# Patient Record
Sex: FEMALE | Race: WHITE | URBAN - METROPOLITAN AREA
[De-identification: names, ages, dates, MRNs, and addresses within clinical notes are randomized per-mention and may not be internally consistent; named-entity substitution may affect disease eponyms.]

---

## 2017-03-02 ENCOUNTER — OFFICE VISIT (OUTPATIENT)
Dept: DERMATOLOGY | Facility: AMBULATORY SURGERY CENTER | Age: 47
End: 2017-03-02

## 2017-03-02 VITALS
WEIGHT: 225 LBS | OXYGEN SATURATION: 98 % | TEMPERATURE: 98.6 F | BODY MASS INDEX: 42.48 KG/M2 | SYSTOLIC BLOOD PRESSURE: 126 MMHG | HEIGHT: 61 IN | RESPIRATION RATE: 18 BRPM | HEART RATE: 62 BPM | DIASTOLIC BLOOD PRESSURE: 78 MMHG

## 2017-03-02 DIAGNOSIS — Z85.828 HISTORY OF BASAL CELL CANCER: ICD-10-CM

## 2017-03-02 DIAGNOSIS — D69.2 PIGMENTED PURPURA (HCC): ICD-10-CM

## 2017-03-02 DIAGNOSIS — D18.01 CHERRY ANGIOMA: ICD-10-CM

## 2017-03-02 DIAGNOSIS — L57.0 LICHENOID KERATOSIS: ICD-10-CM

## 2017-03-02 DIAGNOSIS — L91.8 SKIN TAG: ICD-10-CM

## 2017-03-02 DIAGNOSIS — Z87.898 HISTORY OF ATYPICAL NEVUS: ICD-10-CM

## 2017-03-02 DIAGNOSIS — Z85.820 PERSONAL HISTORY OF MALIGNANT MELANOMA OF SKIN: Primary | ICD-10-CM

## 2017-03-02 DIAGNOSIS — L90.5 SCAR CONDITION AND FIBROSIS OF SKIN: ICD-10-CM

## 2017-03-02 DIAGNOSIS — Z87.2 HISTORY OF ACTINIC KERATOSES: ICD-10-CM

## 2017-03-02 DIAGNOSIS — L82.1 OTHER SEBORRHEIC KERATOSIS: ICD-10-CM

## 2017-03-02 DIAGNOSIS — L73.8 SEBACEOUS HYPERPLASIA: ICD-10-CM

## 2017-03-02 NOTE — PATIENT INSTRUCTIONS
Common Skin Findings    Below are some common benign skin conditions that may have been discussed during your exam:    Dermatofibroma - a benign scar like bump that often looks like a mole but dimples in with lateral pressure. No treatment is required unless it is symptomatic or enlarging in which case a biopsy may become necessary. Amin Angiomas - these are the bright red bumps usually found on the abdomen or trunk. They are benign. Sometimes they will become irritated if traumatized, bleed and need to be removed. They often appear in middle-aged to older adults. Skin Tags - these are benign flesh or brown polyp-like growths common around areas of friction such as the neck, body creases, or around eyes. Nevus - this is the technical term for a benign mole. Changing nevi need to be evaluated and a biopsy may become necessary. Lentigo - this is a benign brown spot usually due to sun exposure. Seborrheic Keratosis - is a common skin growth that is benign and most often appearing in middle-aged to older adults. Most are tan or brown but may vary from flesh colored to black. These start as small bumps that slowly thicken and get a warty surface. A very useful website that you can visit for more information on common skin conditions:  yaltyReview.it  This website was created and is maintained by the [address] [place] [address] [employer]at[employer]. It is the largest, most influential and most representative dermatology group in the [place].

## 2017-03-02 NOTE — MR AVS SNAPSHOT
Visit Information Date & Time Provider Department Dept. Phone Encounter #  
 3/2/2017  3:00 PM MD Teofilo Moe [id number] [phone] [id number] Your Appointments 3/2/2017  3:00 PM  
SKIN EXAM with MD Teofilo Moe 8057 [facility]) Appt Note: est pt; 6 mo skin exam w/DrMoshe [address] [facility] [address] [facility] [address] [facility] [address]  
  
    
 9/7/2017  3:00 PM  
SKIN EXAM with MD Teofilo Moe [facility] Appt Note: est pt: 3 mo skin exam  
 [facility] [address] [facility]  
[phone] Upcoming Health Maintenance Date Due DTaP/Tdap/Td series (1 - Tdap) 3/12/1989 PAP AKA CERVICAL CYTOLOGY 3/12/1989 INFLUENZA AGE 9 TO ADULT 8/1/2016 Allergies as of 3/2/2017  Review Complete On: 3/2/2017 By: Stefany West LPN Severity Noted Reaction Type Reactions Adhesive  06/12/2012    Unable to Obtain Ciprofloxacin  06/12/2012    Unknown (comments) Abstraction- unable to ask patient. Clindamycin  06/12/2012    Unable to Obtain Keflex [Cephalexin]  06/12/2012    Unable to Obtain Abstraction- unable to ask patient. Current Immunizations  Never Reviewed No immunizations on file. Not reviewed this visit Vitals BP  
  
  
  
  
  
 126/78 Vitals History BMI and BSA Data Body Mass Index Body Surface Area 42.51 kg/m 2 2.1 m 2 Preferred Pharmacy Pharmacy Name Phone [address], [address] [place] [address] [phone] Your Updated Medication List  
  
   
This list is accurate as of: 3/2/17  2:57 PM.  Always use your most recent med list.  
  
  
  
  
 allopurinol 100 mg tablet Commonly known as:  Jaunita Phenes Take  by mouth daily. * AUGMENTIN 875-125 mg per tablet Generic drug:  amoxicillin-clavulanate Take  by mouth every twelve (12) hours. * amoxicillin-clavulanate 500-125 mg per tablet Commonly known as:  AUGMENTIN  
take 1 tablet by mouth three times a day FUROSEMIDE PO Take  by mouth. ibuprofen 200 mg Cap Take  by mouth.  
  
 lisinopril 10 mg tablet Commonly known as:  Luiza Alecia Take  by mouth daily. magnesium oxide 500 mg Tab Take  by mouth. METOPROLOL SUCCINATE PO Take  by mouth.  
  
 multivitamin tablet Commonly known as:  ONE A DAY Take 1 Tab by mouth daily. NORVASC PO Take  by mouth. PriLOSEC 20 mg capsule Generic drug:  omeprazole Take 40 mg by mouth daily. sodium bicarbonate 650 mg tablet Take  by mouth four (4) times daily. triamcinolone acetonide 0.1 % ointment Commonly known as:  KENALOG Apply 1 g to affected area two (2) times a day. use thin layer  Indications: SKIN RASH  
  
 VITAMIN D3 1,000 unit Cap Generic drug:  cholecalciferol Take  by mouth. * Notice: This list has 2 medication(s) that are the same as other medications prescribed for you. Read the directions carefully, and ask your doctor or other care provider to review them with you. Patient Instructions Common Skin Findings Below are some common benign skin conditions that may have been discussed during your exam: 
 
Dermatofibroma  a benign scar like bump that often looks like a mole but dimples in with lateral pressure. No treatment is required unless it is symptomatic or enlarging in which case a biopsy may become necessary. Amin Angiomas  these are the bright red bumps usually found on the abdomen or trunk. They are benign. Sometimes they will become irritated if traumatized, bleed and need to be removed. They often appear in middle-aged to older adults. Skin Tags  these are benign flesh or brown polyp-like growths common around areas of friction such as the neck, body creases, or around eyes. Nevus  this is the technical term for a benign mole. Changing nevi need to be evaluated and a biopsy may become necessary. Lentigo  this is a benign brown spot usually due to sun exposure. Seborrheic Keratosis  is a common skin growth that is benign and most often appearing in middle-aged to older adults. Most are tan or brown but may vary from flesh colored to black. These start as small bumps that slowly thicken and get a warty surface. A very useful website that you can visit for more information on common skin conditions: 
LoyaltyReview.it This website was created and is maintained by the [facility]. It is the largest, most influential and most representative dermatology group in the [place]. Introducing [facility] [place] [facility]! [employer] [place] [employer] introduces Watch-Sites patient portal. Now you can access parts of your medical record, email your doctor's office, and request medication refills online. 1. In your internet browser, go to [email or web address] 2. Click on the First Time User? Click Here link in the Sign In box. You will see the New Member Sign Up page. 3. Enter your Watch-Sites Access Code exactly as it appears below. You will not need to use this code after youve completed the sign-up process. If you do not sign up before the expiration date, you must request a new code. · Watch-Sites Access Code: [email or web address] Expires: 5/31/2017  2:57 PM 
 
4. Enter the last four digits of your Social Security Number (xxxx) and Date of Birth (mm/dd/yyyy) as indicated and click Submit. You will be taken to the next sign-up page. 5. Create a Watch-Sites ID.  This will be your Watch-Sites login ID and cannot be changed, so think of one that is secure and easy to remember. 6. Create a [employer] password. You can change your password at any time. 7. Enter your Password Reset Question and Answer. This can be used at a later time if you forget your password. 8. Enter your e-mail address. You will receive e-mail notification when new information is available in [address] 9. Click Sign Up. You can now view and download portions of your medical record. 10. Click the Download Summary menu link to download a portable copy of your medical information. If you have questions, please visit the Frequently Asked Questions section of the [employer] website. Remember, [employer] is NOT to be used for urgent needs. For medical emergencies, dial 911. Now available from your iPhone and Android! Please provide this summary of care documentation to your next provider. If you have any questions after today's visit, please call [phone].

## 2017-03-02 NOTE — PROGRESS NOTES
Chief complaint: Albinism, history of multiple malignant melanomas, basal cell carcinomas, and actinic keratoses for routine exam, history of lung cancer and renal disease       History of present illness: Ms. Amadou Dubose is a 64-year-old lady here for followup. Her last exam was in November 2016. 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 Her last melanoma was diagnosed in approximately 2001. She has also had several basal cell carcinomas managed surgically (last in 2005 left arm), and actinic keratoses managed with cryotherapy (not her preferred method) and topical Aldara. She had surgery in June 2013 followed by chemotherapy for non-small cell lung cancer incidentally found, and she subsequently developed significant renal insufficiency from the chemotherapy. Her renal function is monitored routinely and her creatine had increased to 3.4 in June but has now decreased to 2.9. Surgery for access to dialysis was discussed but is now on hold based on her improving creatinine. She feels in her usual state of health with occasional low energy and stable weight. In December 2015 she had an emergency appendectomy and has recovered well. She reports that despite multiple medical issues she is upbeat and in good spirits, she is now self-employed with [employer]. I reviewed her allergies medications medical and social history. For her skin exam today she notes 2 new spots on her left shoulder, but has no other skin complaints. During her exam in February 2014 a nevus with moderate atypia was narrowly excised from her left posterior thigh. The site is well healed without symptoms. Exam: She is awake alert oriented 64-year-old lady who appears well and in no distress. There is no preauricular, submandibular, or cervical or axillary or inguinal lymphadenopathy.  She has fair skin with white hair and horizontal nystagmus, consistent with her albinism. She has multiple healed surgical scars on her face neck trunk and extremities where skin cancers have been treated. All scars are soft, there is no evidence of recurrence on visual inspection or palpation. She has multiple cherry angiomas, small in size, on her trunk scalp face and arms. She has a dilated vein similar to venous lake on her right upper inner arm, unchanged from previous visit. She has small skin tags on her neck and back. She has several tan seborrheic keratoses on her trunk. She has multiple areas of sebaceous hyperplasia on her forehead and a small yellow papule on her left lower lip at the vermilion most consistent with another sebaceous hyperplasia and several milia on her cheeks and nose. Her left posterior thigh shows a pink subtle scar were nevus with moderate atypia was excised. There is no clinical evidence of persistence or recurrence of this nevus. She has symmetric tan macular lesions on her left shoulder and her left mid back, no atypical features, unchanged from prior examinations. The 2 lesion she noted on her left shoulder are 1 of the existing tan macules without atypical features and a small ecchymosis. Her right dorsal hand has lightly inflamed lichenoid keratosis , less inflamed than on last exam. She has pigmented purpura on her legs, fading. Assessment/plan:    1. History of multiple skin cancers, including multiple melanomas and basal cell carcinomas, and actinic keratoses. Healed surgical scars without evidence of recurrence and no regional adenopathy. Cherry angiomas. Small skin tags. Seborrheic keratoses. Sebaceous hyperplasia, one on the lower lip. Pigmented purpura on the bilateral lower legs, predominantly right. Milia. Venous lake on the arm. Small nevi. Each diagnosis was discussed.  Previously treated sites are well-healed without evidence of recurrence, there is no regional adenopathy. She remains very well educated about her skin, her albinism, and her risk for new skin cancer development and recurrence of previously treated skin cancers. I discussed sun protection, sunscreen use, the warning signs of skin cancer, the need for self-skin examinations, and the need for regular dermatology exams every 6 months. She should follow up sooner as needed if new, changing, or symptomatic skin lesions arise. She understands well. 2. Recently diagnosed atypical nevus Feb 2014, left posterior thigh. The site is healed with a scar, no evidence of residual lesion. Observation with her routine skin exams. 3. She will follow-up with her oncologist, renal MD, and OBMAURICIO and Dr. Gio Guerra at [facility].

## 2017-09-07 ENCOUNTER — OFFICE VISIT (OUTPATIENT)
Dept: DERMATOLOGY | Facility: AMBULATORY SURGERY CENTER | Age: 47
End: 2017-09-07

## 2017-09-07 VITALS
RESPIRATION RATE: 18 BRPM | HEART RATE: 64 BPM | TEMPERATURE: 98.3 F | SYSTOLIC BLOOD PRESSURE: 140 MMHG | DIASTOLIC BLOOD PRESSURE: 72 MMHG | OXYGEN SATURATION: 97 %

## 2017-09-07 DIAGNOSIS — R23.8 VENOUS LAKE: ICD-10-CM

## 2017-09-07 DIAGNOSIS — L57.0 ACTINIC KERATOSIS: ICD-10-CM

## 2017-09-07 DIAGNOSIS — Z87.898 HISTORY OF ATYPICAL NEVUS: ICD-10-CM

## 2017-09-07 DIAGNOSIS — D18.01 CHERRY ANGIOMA: ICD-10-CM

## 2017-09-07 DIAGNOSIS — L91.8 SKIN TAG: ICD-10-CM

## 2017-09-07 DIAGNOSIS — Z85.828 HISTORY OF BASAL CELL CANCER: ICD-10-CM

## 2017-09-07 DIAGNOSIS — L82.1 OTHER SEBORRHEIC KERATOSIS: ICD-10-CM

## 2017-09-07 DIAGNOSIS — L90.5 SCAR CONDITION AND FIBROSIS OF SKIN: ICD-10-CM

## 2017-09-07 DIAGNOSIS — Z85.820 PERSONAL HISTORY OF MALIGNANT MELANOMA OF SKIN: ICD-10-CM

## 2017-09-07 DIAGNOSIS — D22.9 MULTIPLE BENIGN NEVI WITHOUT ATYPIA: ICD-10-CM

## 2017-09-07 DIAGNOSIS — E70.30 ALBINISM (HCC): Primary | ICD-10-CM

## 2017-09-07 RX ORDER — CALCITRIOL 0.25 UG/1
0.25 CAPSULE ORAL DAILY
COMMUNITY

## 2017-09-07 NOTE — PROGRESS NOTES
Chief complaint: Albinism, history of multiple malignant melanomas, basal cell carcinomas, and actinic keratoses for routine exam, history of lung cancer and renal disease      History of present illness: Ms. Westley Rosado is a 53-year-old lady here for followup. Her last exam was in March 2016. 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 Her last melanoma was diagnosed in approximately 2001. She has also had several basal cell carcinomas managed surgically (last in 2005 left arm), and actinic keratoses managed with cryotherapy (not her preferred method) and topical Aldara. She had surgery in June 2013 followed by chemotherapy for non-small cell lung cancer incidentally found, and she subsequently developed significant renal insufficiency from the chemotherapy. Her renal function is monitored routinely and her creatine has been relatively stable ranging around 3. She had CT scan performed in June, which did show interval increase in size of a para-aortic lymph node and an airspace process, and follow-up CT scan more recently identified increase in the size of these nodules, with suggestion this could be an inflammatory or infectious process, malignancy could not be excluded. She is currently undergoing evaluation for approach to diagnosis. She has also had some headaches recently, she has had a head CT as well. Results are pending. She has occasional low energy and stable weight. In December 2015 she had an emergency appendectomy and has recovered well. She reports that despite multiple medical issues she is upbeat and in good spirits, she is now self-employed with [employer]. I reviewed her allergies medications medical and social history. For her skin exam today she notes a persistent scaly area on her right hand and on her upper lip, not significantly changing.  During her exam in February 2014 a nevus with moderate atypia was narrowly excised from her left posterior thigh. The site is well healed without symptoms. Exam: She is awake alert oriented 39-year-old lady who appears well and in no distress. There is no preauricular, submandibular, or cervical or axillary or inguinal lymphadenopathy. She has fair skin with white hair and horizontal nystagmus, consistent with her albinism. She has multiple healed surgical scars on her face neck trunk and extremities where skin cancers have been treated. All scars are soft, there is no evidence of recurrence on visual inspection or palpation. She has multiple cherry angiomas, small in size, on her trunk scalp face and arms, most numerous on her back. She has a dilated vein similar to venous lake on her right upper inner arm, unchanged from previous visit. She has small skin tags on her neck and back. She has several tan seborrheic keratoses on her trunk. She has multiple areas of sebaceous hyperplasia on her forehead and a small yellow papule on her left lower lip at the vermilion most consistent with another sebaceous hyperplasia as well as several milia on her cheeks and nose. Her left posterior thigh shows a pink subtle scar were nevus with moderate atypia was excised. There is no clinical evidence of persistence or recurrence of this nevus. She has symmetric tan macular lesions on her left shoulder and her left mid back, no atypical features, unchanged from prior examinations. She has a scaly patch on her right dorsal hand, thin patch on her upper lip, both consistent with thin actinic keratoses. Assessment/plan:    1. History of multiple skin cancers, including multiple melanomas and basal cell carcinomas, and actinic keratoses. Healed surgical scars without evidence of recurrence and no regional adenopathy. Cherry angiomas. Small skin tags. Seborrheic keratoses. Sebaceous hyperplasia, one on the lower lip. Milia.  Venous sinha on the arm. Small nevi. Each diagnosis was discussed. Previously treated sites are well-healed without evidence of recurrence, there is no regional adenopathy. She remains very well educated about her skin, her albinism, and her risk for new skin cancer development and recurrence of previously treated skin cancers. I discussed sun protection, sunscreen use, the warning signs of skin cancer, the need for self-skin examinations, and the need for regular dermatology exams every 6 months. She should follow up sooner as needed if new, changing, or symptomatic skin lesions arise. She understands well. 2. Recently diagnosed atypical nevus Feb 2014, left posterior thigh. The site is healed with a scar, no evidence of residual lesion. Observation with her routine skin exams. 3. She will follow-up with her oncologist, renal physician, and OBGYN and Dr. Kim Alvarez at [facility]. She will communicate with me the results of the imaging studies once available.

## 2018-03-01 ENCOUNTER — OFFICE VISIT (OUTPATIENT)
Dept: DERMATOLOGY | Facility: AMBULATORY SURGERY CENTER | Age: 48
End: 2018-03-01

## 2018-03-01 VITALS
HEIGHT: 61 IN | SYSTOLIC BLOOD PRESSURE: 130 MMHG | DIASTOLIC BLOOD PRESSURE: 80 MMHG | WEIGHT: 205 LBS | OXYGEN SATURATION: 98 % | TEMPERATURE: 98 F | HEART RATE: 66 BPM | BODY MASS INDEX: 38.71 KG/M2

## 2018-03-01 DIAGNOSIS — D22.9 MULTIPLE BENIGN NEVI WITHOUT ATYPIA: ICD-10-CM

## 2018-03-01 DIAGNOSIS — Z85.820 PERSONAL HISTORY OF MALIGNANT MELANOMA OF SKIN: ICD-10-CM

## 2018-03-01 DIAGNOSIS — L57.0 ACTINIC KERATOSIS: Primary | ICD-10-CM

## 2018-03-01 DIAGNOSIS — L90.5 SCAR CONDITION AND FIBROSIS OF SKIN: ICD-10-CM

## 2018-03-01 DIAGNOSIS — L72.0 MILIAL CYST: ICD-10-CM

## 2018-03-01 DIAGNOSIS — L91.8 SKIN TAG: ICD-10-CM

## 2018-03-01 DIAGNOSIS — R23.8 VENOUS LAKE: ICD-10-CM

## 2018-03-01 DIAGNOSIS — D18.01 CHERRY ANGIOMA: ICD-10-CM

## 2018-03-01 DIAGNOSIS — L73.8 SEBACEOUS HYPERPLASIA: ICD-10-CM

## 2018-03-01 DIAGNOSIS — Z85.828 HISTORY OF BASAL CELL CANCER: ICD-10-CM

## 2018-03-01 DIAGNOSIS — Z87.2 HISTORY OF ACTINIC KERATOSES: ICD-10-CM

## 2018-03-01 DIAGNOSIS — L82.1 OTHER SEBORRHEIC KERATOSIS: ICD-10-CM

## 2018-03-01 DIAGNOSIS — E70.30 ALBINISM (HCC): ICD-10-CM

## 2018-03-01 RX ORDER — CALCITRIOL 0.25 UG/1
0.25 CAPSULE ORAL
COMMUNITY
Start: 2016-02-22 | End: 2018-03-01 | Stop reason: SDUPTHER

## 2018-03-01 RX ORDER — ACETAMINOPHEN 325 MG/1
325 TABLET ORAL
COMMUNITY

## 2018-03-01 RX ORDER — METOPROLOL TARTRATE 25 MG/1
25 TABLET, FILM COATED ORAL
COMMUNITY
Start: 2013-07-29

## 2018-03-01 RX ORDER — AMLODIPINE BESYLATE 10 MG/1
10 TABLET ORAL
COMMUNITY
Start: 2017-03-20

## 2018-03-01 RX ORDER — OMEPRAZOLE 40 MG/1
40 CAPSULE, DELAYED RELEASE ORAL
COMMUNITY
Start: 2017-03-20

## 2018-03-01 RX ORDER — LOPERAMIDE HYDROCHLORIDE 2 MG/1
CAPSULE ORAL
COMMUNITY

## 2018-03-01 RX ORDER — SODIUM BICARBONATE 650 MG/1
TABLET ORAL
COMMUNITY
Start: 2016-02-28 | End: 2018-03-01 | Stop reason: SDUPTHER

## 2018-03-01 RX ORDER — ALLOPURINOL 100 MG/1
100 TABLET ORAL
COMMUNITY
End: 2018-03-01 | Stop reason: SDUPTHER

## 2018-03-01 RX ORDER — CARVEDILOL 3.12 MG/1
TABLET ORAL
COMMUNITY
Start: 2017-10-23

## 2018-03-01 RX ORDER — AMOXICILLIN AND CLAVULANATE POTASSIUM 500; 125 MG/1; MG/1
TABLET, FILM COATED ORAL
COMMUNITY
Start: 2017-08-28 | End: 2018-03-01 | Stop reason: ALTCHOICE

## 2018-03-01 RX ORDER — FUROSEMIDE 40 MG/1
TABLET ORAL
COMMUNITY
Start: 2014-08-07

## 2018-03-01 NOTE — PROGRESS NOTES
Chief complaint: Albinism, history of multiple malignant melanomas, basal cell carcinomas, and actinic keratoses for routine exam, history of lung cancer and renal disease      History of present illness: Ms. Andreina Sandoval is a 70-year-old lady here for followup. Her last exam was in September 2017. 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 Her last melanoma was diagnosed in approximately 2001, her first when she was [age]. She has also had several basal cell carcinomas managed surgically (last in 2005 left arm), and actinic keratoses managed with cryotherapy (not her preferred method) and topical Aldara. She had surgery in June 2013 followed by chemotherapy for non-small cell lung cancer incidentally found, and she subsequently developed significant renal insufficiency from the chemotherapy. Her renal function is monitored routinely and her creatine has been relatively stable ranging around 3. She had CT scan performed in June, which did show interval increase in size of a para-aortic lymph node and an airspace process, and follow-up CT scan more recently identified increase in the size of these nodules, with suggestion this could be an inflammatory or infectious process, malignancy could not be excluded. Unfortunately, repeated testing has confirmed progression of her lung cancer, and she is now on oral chemotherapy, Tagrisso. She reports benefit from this oral treatment in terms of reduction in size of lung nodules, but she has lost weight due to low appetite and diarrhea. In December 2015 she had an emergency appendectomy and has recovered well. She reports that despite multiple medical issues she is upbeat and in good spirits, she is now self-employed with [employer] and participates in [employer]. I reviewed her allergies medications medical and social history. For her skin exam today she notes no specific skin lesions, she is careful to observe her skin regularly with her roommate Annette's help. During her exam in February 2014 a nevus with moderate atypia was narrowly excised from her left posterior thigh. The site is well healed without symptoms. Exam: She is awake alert oriented 77-year-old lady who appears well and in no distress. She has lost weight. There is no preauricular, submandibular, or cervical or axillary or inguinal lymphadenopathy. She has fair skin with white hair and horizontal nystagmus, consistent with her albinism. She has multiple healed surgical scars on her face neck trunk and extremities where skin cancers have been treated. All scars are soft, there is no evidence of recurrence of treated skin cancers on visual inspection or palpation. She has multiple cherry angiomas, small in size, on her trunk scalp face and arms, most numerous on her back. She has a dilated vein similar to venous lake on her right upper inner arm, unchanged from previous examinations. She has small skin tags on her neck and back. She has several tan seborrheic keratoses on her trunk, several on her extremities. She has multiple areas of sebaceous hyperplasia on her forehead and a small yellow papule on her left lower lip at the vermilion most consistent with another sebaceous hyperplasia as well as several milia on her cheeks and nose. She has subtle dry skin on each cheek that may be mild actinic damage, no concern for malignancy. Her left posterior thigh shows a pink subtle scar were nevus with moderate atypia was excised. There is no clinical evidence of persistence or recurrence of this nevus. She has a scaly patch on her right dorsal hand, similar but smaller lesions on the left hand. Assessment/plan:    1. Albinism. History of multiple skin cancers, including multiple melanomas and basal cell carcinomas, and actinic keratoses.  Healed surgical scars without evidence of recurrence and no regional adenopathy. Cherry angiomas. Small skin tags. Seborrheic keratoses. Sebaceous hyperplasia on the face. Milia. Venous lake on the arm. Small nevi. Thin AKs vs lichenoid keratoses. Each diagnosis was discussed. Previously treated sites are well-healed without evidence of recurrence, there is no regional adenopathy. She remains very well educated about her skin, her albinism, and her risk for new skin cancer development and recurrence of previously treated skin cancers. I discussed sun protection, sunscreen use, the warning signs of skin cancer, the need for self-skin examinations, and the need for regular dermatology exams every 6 months. She should follow up sooner as needed if new, changing, or symptomatic skin lesions arise. She understands well. 2. Recently diagnosed atypical nevus Feb 2014, left posterior thigh. The site is healed with a scar, no evidence of residual lesion. Observation with her routine skin exams. 3. She will follow-up with her oncologist, renal physician, and OBGYN and Dr. Gerard Baldwin at [facility]. She has several follow-up visits in the near future, and she will keep me updated regarding reports or changes in therapy.

## 2018-03-01 NOTE — PATIENT INSTRUCTIONS
No chief complaint on file. 1. Have you been to the ER, urgent care clinic since your last visit? Hospitalized since your last visit? No    2. Have you seen or consulted any other health care providers outside of the [address] [place] [address] since your last visit? Include any pap smears or colon screening.  Yes, Dr. Bean [facility] and Dr. Jak Sanz oncology

## 2018-07-26 ENCOUNTER — OFFICE VISIT (OUTPATIENT)
Dept: DERMATOLOGY | Facility: AMBULATORY SURGERY CENTER | Age: 48
End: 2018-07-26

## 2018-07-26 VITALS
DIASTOLIC BLOOD PRESSURE: 80 MMHG | BODY MASS INDEX: 38.71 KG/M2 | WEIGHT: 205 LBS | HEIGHT: 61 IN | SYSTOLIC BLOOD PRESSURE: 140 MMHG | RESPIRATION RATE: 18 BRPM | OXYGEN SATURATION: 97 % | HEART RATE: 72 BPM

## 2018-07-26 DIAGNOSIS — E70.30 ALBINISM (HCC): Primary | ICD-10-CM

## 2018-07-26 DIAGNOSIS — D18.01 CHERRY ANGIOMA: ICD-10-CM

## 2018-07-26 DIAGNOSIS — R23.8 VENOUS LAKE: ICD-10-CM

## 2018-07-26 DIAGNOSIS — L73.8 SEBACEOUS HYPERPLASIA: ICD-10-CM

## 2018-07-26 DIAGNOSIS — L72.0 MILIAL CYST: ICD-10-CM

## 2018-07-26 DIAGNOSIS — L82.1 OTHER SEBORRHEIC KERATOSIS: ICD-10-CM

## 2018-07-26 DIAGNOSIS — L91.8 SKIN TAG: ICD-10-CM

## 2018-07-26 DIAGNOSIS — L57.0 LICHENOID KERATOSIS: ICD-10-CM

## 2018-07-26 DIAGNOSIS — Z85.820 PERSONAL HISTORY OF MALIGNANT MELANOMA OF SKIN: ICD-10-CM

## 2018-07-26 NOTE — PROGRESS NOTES
This note was written by Chiquita Arango, as dictated by Laureen Livingston MD.     Chief complaint: Full skin exam, Albinism, history of multiple malignant melanomas, basal cell carcinomas, and actinic keratoses for routine exam, history of lung cancer and renal disease      History of present illness:   Ms. Darby Frey is a 49-year-old lady here for followup. Her last exam was 03/01/18. 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 Her last melanoma was diagnosed in approximately 2001, her first when she was [age]. She has also had several basal cell carcinomas managed surgically (last in 2005 left arm), and actinic keratoses managed with cryotherapy (not her preferred method) and topical Aldara. She has chronic lymphedema of the right lower extremity after inguinal lymph node surgery. For her skin exam today she notes no specific skin lesions, she is careful to observe her skin regularly with her roommate Annette's help. During her exam in February 2014 a nevus with moderate atypia was narrowly excised from her left posterior thigh. The site is well healed without symptoms. She is very careful BU sun protection all times due to her albinism and sun sensitivity. She had surgery in June 2013 followed by chemotherapy for non-small cell lung cancer which was incidentally found, and she subsequently developed significant renal insufficiency from the chemotherapy. Her renal function is monitored routinely and her creatine has been relatively stable ranging around 3, most recently 2.6 on a check 2 weeks ago. She had progression of her lung cancer diagnosed in fall 2017, and she is now on oral chemotherapy, Hiram Crimes, which she is tolerating well other than GI side effects which has lessened with a lower dose of the medication.  She reports benefit from this oral treatment in terms of reduction in size of lung nodules, but a right chest lesion suspected to be a lymph node has been increasing in size, this will be monitored with a another CT scan and September. She has lost weight due to low appetite and diarrhea, the symptoms are currently stable. She also repots her Potassium levels have increased since the last report sent to me. She reports that despite multiple medical issues she is upbeat and in good spirits. I reviewed her allergies medications medical and social history.        Exam:   She is awake alert oriented 61-year-old lady who appears well and in no distress. She has lost weight. There is no preauricular, submandibular, or cervical or axillary or inguinal lymphadenopathy. She has fair skin with white hair and horizontal nystagmus, consistent with her albinism. She has multiple healed surgical scars on her face neck trunk and extremities where skin cancers have been treated. All scars are soft, there is no evidence of recurrence of treated skin cancers on visual inspection or palpation. She has multiple cherry angiomas, small in size, on her trunk scalp face and arms, most numerous on her back. She has a dilated vein similar to venous lake on her right upper inner arm, unchanged from previous examinations. She has small skin tags on her neck and back, one skin tag on her back has increased in size. She has several tan seborrheic keratoses on her trunk, several on her extremities. She has multiple areas of sebaceous hyperplasia on her forehead and a small yellow papule on her left lower lip at the vermilion most consistent with another sebaceous hyperplasia as well as several milia on her cheeks and nose. Her left posterior thigh shows a pink subtle scar were nevus with moderate atypia was excised. There is no clinical evidence of persistence or recurrence of this nevus.  She has a scaly patch on her right dorsal hand, similar but smaller lesions on the left hand, these are lichenoid keratoses on the back of each hand.      Assessment/plan:      1. Albinism. History of multiple skin cancers, including multiple melanomas and basal cell carcinomas,  and actinic keratoses. Healed surgical scars without evidence of recurrence and no regional adenopathy. Cherry angiomas. Small skin tags. Seborrheic keratoses. Sebaceous hyperplasia on the face. Milia. Venous lake on the arm. Small nevi. Lichenoid keratoses.  Each diagnosis was discussed. Previously treated sites are well-healed without evidence of recurrence, there is no regional adenopathy. She remains very well educated about her skin, her albinism, and her risk for new skin cancer development and recurrence of previously treated skin cancers. I discussed sun protection, sunscreen use, the warning signs of skin cancer, the need for self-skin examinations, and the need for regular dermatology exams every 4-6 months. She should follow up sooner as needed if new, changing, or symptomatic skin lesions arise. She understands well.      2. Recently diagnosed atypical nevus Feb 2014, left posterior thigh. The site is healed with a scar, no evidence of residual lesion. Observation with her routine skin exams. 3. She will follow-up with her oncologist, renal physician, and OBGYN and Dr. Anat Ross at [facility] has several follow-up visits in the near future, and she will keep me updated regarding reports or changes in therapy. This scribe documentation was reviewed by me and accurately reflects the examination and decisions made by me.